# Patient Record
Sex: MALE | Race: WHITE | Employment: FULL TIME | URBAN - METROPOLITAN AREA
[De-identification: names, ages, dates, MRNs, and addresses within clinical notes are randomized per-mention and may not be internally consistent; named-entity substitution may affect disease eponyms.]

---

## 2023-05-18 ENCOUNTER — OFFICE VISIT (OUTPATIENT)
Dept: ENT CLINIC | Age: 38
End: 2023-05-18
Payer: OTHER GOVERNMENT

## 2023-05-18 VITALS — BODY MASS INDEX: 28.01 KG/M2 | TEMPERATURE: 97.9 F | HEIGHT: 76 IN | WEIGHT: 230 LBS | RESPIRATION RATE: 16 BRPM

## 2023-05-18 DIAGNOSIS — K13.0 MUCOCELE OF LOWER LIP: Primary | ICD-10-CM

## 2023-05-18 PROCEDURE — 99202 OFFICE O/P NEW SF 15 MIN: CPT | Performed by: OTOLARYNGOLOGY

## 2023-05-18 ASSESSMENT — ENCOUNTER SYMPTOMS
SHORTNESS OF BREATH: 0
WHEEZING: 0
ABDOMINAL PAIN: 0

## 2023-05-18 NOTE — PROGRESS NOTES
[address], [address], [address] [place] [address]  P: [phone]       Patient     Jarrett Floyd  1985    Chief Concern     Chief Complaint   Patient presents with    New Patient     States that the area is on the inside of his lower lip, it is sensitive but not painful       Assessment and Plan      Diagnosis Orders   1. Mucocele of lower lip          44-year-old male with a lower lip mucocele, discussed further steps including observation, excision under local anesthesia versus general anesthesia-he will prefer local anesthesia risk and benefits including recurrence, pain, inflammation, hypoesthesia, cosmesis outlined and he agreed to proceed-we will plan for this under local anesthesia in the clinic. No follow-ups on file. History of Present Illness     Jarrett Floyd and is a 44-year-old male with concern for lower lobe mass that has been growing progressively, he states he punctured it with his teeth and it drained, then recurred. Past Medical History     No past medical history on file. Past Surgical History     No past surgical history on file. Family History     No family history on file. Social History     Social History     Tobacco Use    Smoking status: Never    Smokeless tobacco: Never   Vaping Use    Vaping Use: Never used   Substance Use Topics    Alcohol use: Yes     Comment: Occasional    Drug use: Never        Allergies     No Known Allergies    Medications     No current outpatient medications on file. No current facility-administered medications for this visit. Review of Systems     Review of Systems   Constitutional:  Negative for activity change, chills, diaphoresis, fatigue and fever. HENT:  Negative for congestion and hearing loss. Eyes:  Negative for visual disturbance. Respiratory:  Negative for shortness of breath and wheezing. Cardiovascular:  Negative for chest pain.    Gastrointestinal:  Negative for